# Patient Record
Sex: MALE | URBAN - METROPOLITAN AREA
[De-identification: names, ages, dates, MRNs, and addresses within clinical notes are randomized per-mention and may not be internally consistent; named-entity substitution may affect disease eponyms.]

---

## 2019-12-28 ENCOUNTER — TELEPHONE (OUTPATIENT)
Dept: GASTROENTEROLOGY | Facility: MEDICAL CENTER | Age: 79
End: 2019-12-28

## 2019-12-29 NOTE — TELEPHONE ENCOUNTER
I spoke to the patient's wife and the patient regarding colonoscopy which is planned on 12/30/2019  Due to hematuria and nephrolithiasis there concern regarding acute kidney injury in setting of preparing for colonoscopy evaluation  They would like to defer colonoscopy at this time  I was them to give us a call to reschedule colonoscopy when acute issues resolve